# Patient Record
Sex: MALE | Race: WHITE | NOT HISPANIC OR LATINO | URBAN - METROPOLITAN AREA
[De-identification: names, ages, dates, MRNs, and addresses within clinical notes are randomized per-mention and may not be internally consistent; named-entity substitution may affect disease eponyms.]

---

## 2022-07-12 ENCOUNTER — EMERGENCY (EMERGENCY)
Facility: HOSPITAL | Age: 72
LOS: 1 days | Discharge: HOME | End: 2022-07-12
Attending: EMERGENCY MEDICINE | Admitting: HOSPITALIST

## 2022-07-12 VITALS
RESPIRATION RATE: 18 BRPM | DIASTOLIC BLOOD PRESSURE: 57 MMHG | OXYGEN SATURATION: 98 % | SYSTOLIC BLOOD PRESSURE: 138 MMHG | TEMPERATURE: 98 F | HEART RATE: 68 BPM

## 2022-07-12 LAB
ALBUMIN SERPL ELPH-MCNC: 4.1 G/DL — SIGNIFICANT CHANGE UP (ref 3.3–5)
ALP SERPL-CCNC: 60 U/L — SIGNIFICANT CHANGE UP (ref 40–120)
ALT FLD-CCNC: 7 U/L — SIGNIFICANT CHANGE UP (ref 4–41)
AMPHET UR-MCNC: NEGATIVE — SIGNIFICANT CHANGE UP
ANION GAP SERPL CALC-SCNC: 10 MMOL/L — SIGNIFICANT CHANGE UP (ref 7–14)
ANION GAP SERPL CALC-SCNC: 9 MMOL/L — SIGNIFICANT CHANGE UP (ref 7–14)
APAP SERPL-MCNC: <10 UG/ML — LOW (ref 15–25)
APPEARANCE UR: CLEAR — SIGNIFICANT CHANGE UP
AST SERPL-CCNC: 13 U/L — SIGNIFICANT CHANGE UP (ref 4–40)
BARBITURATES UR SCN-MCNC: NEGATIVE — SIGNIFICANT CHANGE UP
BASOPHILS # BLD AUTO: 0.04 K/UL — SIGNIFICANT CHANGE UP (ref 0–0.2)
BASOPHILS NFR BLD AUTO: 0.4 % — SIGNIFICANT CHANGE UP (ref 0–2)
BENZODIAZ UR-MCNC: NEGATIVE — SIGNIFICANT CHANGE UP
BILIRUB SERPL-MCNC: 0.4 MG/DL — SIGNIFICANT CHANGE UP (ref 0.2–1.2)
BILIRUB UR-MCNC: NEGATIVE — SIGNIFICANT CHANGE UP
BUN SERPL-MCNC: 12 MG/DL — SIGNIFICANT CHANGE UP (ref 7–23)
BUN SERPL-MCNC: 13 MG/DL — SIGNIFICANT CHANGE UP (ref 7–23)
CALCIUM SERPL-MCNC: 8.8 MG/DL — SIGNIFICANT CHANGE UP (ref 8.4–10.5)
CALCIUM SERPL-MCNC: 9.3 MG/DL — SIGNIFICANT CHANGE UP (ref 8.4–10.5)
CHLORIDE SERPL-SCNC: 95 MMOL/L — LOW (ref 98–107)
CHLORIDE SERPL-SCNC: 96 MMOL/L — LOW (ref 98–107)
CO2 SERPL-SCNC: 23 MMOL/L — SIGNIFICANT CHANGE UP (ref 22–31)
CO2 SERPL-SCNC: 24 MMOL/L — SIGNIFICANT CHANGE UP (ref 22–31)
COCAINE METAB.OTHER UR-MCNC: NEGATIVE — SIGNIFICANT CHANGE UP
COLOR SPEC: YELLOW — SIGNIFICANT CHANGE UP
COVID-19 SPIKE DOMAIN AB INTERP: POSITIVE
COVID-19 SPIKE DOMAIN ANTIBODY RESULT: >250 U/ML — HIGH
CREAT SERPL-MCNC: 0.68 MG/DL — SIGNIFICANT CHANGE UP (ref 0.5–1.3)
CREAT SERPL-MCNC: 0.82 MG/DL — SIGNIFICANT CHANGE UP (ref 0.5–1.3)
CREATININE URINE RESULT, DAU: 123 MG/DL — SIGNIFICANT CHANGE UP
DIFF PNL FLD: NEGATIVE — SIGNIFICANT CHANGE UP
EGFR: 93 ML/MIN/1.73M2 — SIGNIFICANT CHANGE UP
EGFR: 99 ML/MIN/1.73M2 — SIGNIFICANT CHANGE UP
EOSINOPHIL # BLD AUTO: 0.06 K/UL — SIGNIFICANT CHANGE UP (ref 0–0.5)
EOSINOPHIL NFR BLD AUTO: 0.6 % — SIGNIFICANT CHANGE UP (ref 0–6)
ETHANOL SERPL-MCNC: <10 MG/DL — SIGNIFICANT CHANGE UP
GLUCOSE SERPL-MCNC: 66 MG/DL — LOW (ref 70–99)
GLUCOSE SERPL-MCNC: 83 MG/DL — SIGNIFICANT CHANGE UP (ref 70–99)
GLUCOSE UR QL: ABNORMAL
HCT VFR BLD CALC: 34.4 % — LOW (ref 39–50)
HGB BLD-MCNC: 11.8 G/DL — LOW (ref 13–17)
IANC: 7.38 K/UL — SIGNIFICANT CHANGE UP (ref 1.8–7.4)
IMM GRANULOCYTES NFR BLD AUTO: 0.5 % — SIGNIFICANT CHANGE UP (ref 0–1.5)
KETONES UR-MCNC: NEGATIVE — SIGNIFICANT CHANGE UP
LEUKOCYTE ESTERASE UR-ACNC: NEGATIVE — SIGNIFICANT CHANGE UP
LYMPHOCYTES # BLD AUTO: 1.32 K/UL — SIGNIFICANT CHANGE UP (ref 1–3.3)
LYMPHOCYTES # BLD AUTO: 13.8 % — SIGNIFICANT CHANGE UP (ref 13–44)
MCHC RBC-ENTMCNC: 30.3 PG — SIGNIFICANT CHANGE UP (ref 27–34)
MCHC RBC-ENTMCNC: 34.3 GM/DL — SIGNIFICANT CHANGE UP (ref 32–36)
MCV RBC AUTO: 88.2 FL — SIGNIFICANT CHANGE UP (ref 80–100)
METHADONE UR-MCNC: NEGATIVE — SIGNIFICANT CHANGE UP
MONOCYTES # BLD AUTO: 0.71 K/UL — SIGNIFICANT CHANGE UP (ref 0–0.9)
MONOCYTES NFR BLD AUTO: 7.4 % — SIGNIFICANT CHANGE UP (ref 2–14)
NEUTROPHILS # BLD AUTO: 7.38 K/UL — SIGNIFICANT CHANGE UP (ref 1.8–7.4)
NEUTROPHILS NFR BLD AUTO: 77.3 % — HIGH (ref 43–77)
NITRITE UR-MCNC: NEGATIVE — SIGNIFICANT CHANGE UP
NRBC # BLD: 0 /100 WBCS — SIGNIFICANT CHANGE UP
NRBC # FLD: 0 K/UL — SIGNIFICANT CHANGE UP
OPIATES UR-MCNC: NEGATIVE — SIGNIFICANT CHANGE UP
OXYCODONE UR-MCNC: NEGATIVE — SIGNIFICANT CHANGE UP
PCP SPEC-MCNC: SIGNIFICANT CHANGE UP
PCP UR-MCNC: NEGATIVE — SIGNIFICANT CHANGE UP
PH UR: 6 — SIGNIFICANT CHANGE UP (ref 5–8)
PLATELET # BLD AUTO: 257 K/UL — SIGNIFICANT CHANGE UP (ref 150–400)
POTASSIUM SERPL-MCNC: 3.7 MMOL/L — SIGNIFICANT CHANGE UP (ref 3.5–5.3)
POTASSIUM SERPL-MCNC: 4.8 MMOL/L — SIGNIFICANT CHANGE UP (ref 3.5–5.3)
POTASSIUM SERPL-SCNC: 3.7 MMOL/L — SIGNIFICANT CHANGE UP (ref 3.5–5.3)
POTASSIUM SERPL-SCNC: 4.8 MMOL/L — SIGNIFICANT CHANGE UP (ref 3.5–5.3)
PROT SERPL-MCNC: 6.8 G/DL — SIGNIFICANT CHANGE UP (ref 6–8.3)
PROT UR-MCNC: ABNORMAL
RBC # BLD: 3.9 M/UL — LOW (ref 4.2–5.8)
RBC # FLD: 13.6 % — SIGNIFICANT CHANGE UP (ref 10.3–14.5)
SALICYLATES SERPL-MCNC: <0.3 MG/DL — LOW (ref 15–30)
SARS-COV-2 IGG+IGM SERPL QL IA: >250 U/ML — HIGH
SARS-COV-2 IGG+IGM SERPL QL IA: POSITIVE
SARS-COV-2 RNA SPEC QL NAA+PROBE: SIGNIFICANT CHANGE UP
SODIUM SERPL-SCNC: 128 MMOL/L — LOW (ref 135–145)
SODIUM SERPL-SCNC: 129 MMOL/L — LOW (ref 135–145)
SP GR SPEC: 1.02 — SIGNIFICANT CHANGE UP (ref 1–1.05)
THC UR QL: POSITIVE
TOXICOLOGY SCREEN, DRUGS OF ABUSE, SERUM RESULT: SIGNIFICANT CHANGE UP
TSH SERPL-MCNC: 2.9 UIU/ML — SIGNIFICANT CHANGE UP (ref 0.27–4.2)
UROBILINOGEN FLD QL: SIGNIFICANT CHANGE UP
WBC # BLD: 9.56 K/UL — SIGNIFICANT CHANGE UP (ref 3.8–10.5)
WBC # FLD AUTO: 9.56 K/UL — SIGNIFICANT CHANGE UP (ref 3.8–10.5)

## 2022-07-12 PROCEDURE — 99285 EMERGENCY DEPT VISIT HI MDM: CPT

## 2022-07-12 RX ORDER — TIZANIDINE 4 MG/1
2 TABLET ORAL EVERY 6 HOURS
Refills: 0 | Status: DISCONTINUED | OUTPATIENT
Start: 2022-07-12 | End: 2022-07-13

## 2022-07-12 RX ORDER — LISINOPRIL 2.5 MG/1
10 TABLET ORAL DAILY
Refills: 0 | Status: DISCONTINUED | OUTPATIENT
Start: 2022-07-12 | End: 2022-07-13

## 2022-07-12 RX ORDER — TAMSULOSIN HYDROCHLORIDE 0.4 MG/1
0.4 CAPSULE ORAL AT BEDTIME
Refills: 0 | Status: DISCONTINUED | OUTPATIENT
Start: 2022-07-12 | End: 2022-07-13

## 2022-07-12 RX ORDER — LEVETIRACETAM 250 MG/1
750 TABLET, FILM COATED ORAL
Refills: 0 | Status: DISCONTINUED | OUTPATIENT
Start: 2022-07-12 | End: 2022-07-13

## 2022-07-12 RX ORDER — RISEDRONATE SODIUM 25.8; 4.2 MG/1; MG/1
35 TABLET, FILM COATED ORAL
Refills: 0 | Status: DISCONTINUED | OUTPATIENT
Start: 2022-07-12 | End: 2022-07-14

## 2022-07-12 RX ORDER — POLYETHYLENE GLYCOL 3350 17 G/17G
17 POWDER, FOR SOLUTION ORAL DAILY
Refills: 0 | Status: DISCONTINUED | OUTPATIENT
Start: 2022-07-12 | End: 2022-07-13

## 2022-07-12 RX ORDER — PANTOPRAZOLE SODIUM 20 MG/1
40 TABLET, DELAYED RELEASE ORAL
Refills: 0 | Status: DISCONTINUED | OUTPATIENT
Start: 2022-07-12 | End: 2022-07-13

## 2022-07-12 RX ORDER — BACLOFEN 100 %
10 POWDER (GRAM) MISCELLANEOUS EVERY 8 HOURS
Refills: 0 | Status: DISCONTINUED | OUTPATIENT
Start: 2022-07-12 | End: 2022-07-13

## 2022-07-12 RX ORDER — RISEDRONATE SODIUM 25.8; 4.2 MG/1; MG/1
35 TABLET, FILM COATED ORAL
Refills: 0 | Status: DISCONTINUED | OUTPATIENT
Start: 2022-07-12 | End: 2022-07-12

## 2022-07-12 RX ORDER — GABAPENTIN 400 MG/1
400 CAPSULE ORAL THREE TIMES A DAY
Refills: 0 | Status: DISCONTINUED | OUTPATIENT
Start: 2022-07-12 | End: 2022-07-13

## 2022-07-12 RX ADMIN — GABAPENTIN 400 MILLIGRAM(S): 400 CAPSULE ORAL at 18:31

## 2022-07-12 RX ADMIN — TIZANIDINE 2 MILLIGRAM(S): 4 TABLET ORAL at 23:27

## 2022-07-12 RX ADMIN — TAMSULOSIN HYDROCHLORIDE 0.4 MILLIGRAM(S): 0.4 CAPSULE ORAL at 18:30

## 2022-07-12 RX ADMIN — Medication 10 MILLIGRAM(S): at 23:27

## 2022-07-12 RX ADMIN — LEVETIRACETAM 750 MILLIGRAM(S): 250 TABLET, FILM COATED ORAL at 18:31

## 2022-07-12 NOTE — ED PROVIDER NOTE - CPE EDP GASTRO NORM
----- Message from ELVIRA Romero sent at 4/8/2019  7:47 AM CDT -----  Please notify the patient of normal lab/xray results.  Follow-up as planned.  
Spoke with patient and given below results.  Patient states she is feeling fine right now.  
normal...

## 2022-07-12 NOTE — ED BEHAVIORAL HEALTH ASSESSMENT NOTE - AXIS III
MS, GERD, BPH, HTN, Arthritis Hx of meniscus tear/ACL tear bilateral knee & Hx of falls (uses walker) MS, GERD, BPH, HTN, Arthritis Hx of meniscus tear/ACL tear bilateral knee & Hx of falls (uses walker),sz disorder

## 2022-07-12 NOTE — ED BEHAVIORAL HEALTH ASSESSMENT NOTE - MEDICAL ISSUES AND PLAN (INCLUDE STANDING AND PRN MEDICATION)
Acetyl L-Carnitine & Alpha Lipoic Acid- 650mg, Levetiracetam 750mg BID, Metamucil 2 capsules BID, Lisinopril 10mg daily, Cheleated Mg 250mg daily, Nexium 20mg daily, Gabapentin 400mg TID, Baclofen 10mg PRN, Tamsulosin HCL 0.4mg QHS, Probiotic Capsule- 13 billion CFUs QHS, Risedronate 35mg - 1/week Saturday evening, Tizanidine 2mg HCL PRN

## 2022-07-12 NOTE — ED BEHAVIORAL HEALTH ASSESSMENT NOTE - NSSUICPROTFACT_PSY_ALL_CORE
Responsibility to children, family, or others Responsibility to children, family, or others/Positive therapeutic relationships

## 2022-07-12 NOTE — ED BEHAVIORAL HEALTH ASSESSMENT NOTE - PSYCHIATRIC ISSUES AND PLAN (INCLUDE STANDING AND PRN MEDICATION)
Consider ECT; Zyprexa 2.5mg PO/IM PRN agitation Consider ECT; Zyprexa 2.5mg PO/IM PRN agitation if QTC less than 500

## 2022-07-12 NOTE — ED PROVIDER NOTE - PATIENT PORTAL LINK FT
You can access the FollowMyHealth Patient Portal offered by [facility] by registering at the following website: [email or web address]. By joining [facility]’s FollowMyHealth portal, you will also be able to view your health information using other applications (apps) compatible with our system.

## 2022-07-12 NOTE — ED BEHAVIORAL HEALTH NOTE - BEHAVIORAL HEALTH NOTE
Worker called patient’s wife Antoinette Ruth ([phone]) for collateral information. All information is as per Mrs. Ruth:    Patient is a 72-year-old male, domiciled with wife, with a dx of depression, with a last psychiatric admission at [facility] years ago for suicidal ideation for more than a week, bib accompanied by spouse for worsening depression. Mrs. Ruth states that the patient walks with a walker and has trouble walking. She states that she wanted to take the patient to [facility], but they are on diversion. She states that the patient’s psychiatrist Zoey Méndez ([phone]) recommended the patient to go to the ed because the patient is very depressed. She states that she sent documentation of the patient’s medication list (provided to team). She states that the patient is not taking any anti-depressants because of his hypernatremia. She states that the patient is complaint with his medications. She states that the patient’s sleeping has been on and off. Patient has had MS for more than twenty years. Patient has been having diarrhea since yesterday. This is one of the patient’s triggers because pt tends to soil himself. She states that the patient saw his  yesterday and then came home and had a bowel accident. She states that the patient has been saying that he does not want to be alive anymore. Wife states that at times he would say this when he is tired. Since the pandemic the patient has been more isolated. Patient had hearing loss 6 months and had an MRI which came back normal. Wife states that the patient has a HHA two days a week who helps him with cooking, bathing, and keeps him company. Wife states she has been pushing the patient to engage in PT but he keeps saying, “I will do it tomorrow”. She states that the patient has had several knee surgeries and had a surgery with his ankle. She states that the patient has no AH/VH and is not violent or aggressive. She states that the patient is a recovering alcoholic and has not used alcohol since last October. She states that the patient had a plan to put a plastic bag over his head when he was admitted to [facility] a couple years ago. Pt has no access to guns or weapons. Patient was vaccinated for covid-19 with two doses and boosted (moderna 3/1/2021; 3/29/2021; 11/12/2021). Patient has been saying he does not want to be alive, and he does not want to be a burden on anyone. Patient has no family hx of mental illness. Patient’s father who is 95 has dementia. Case discussed with psychiatry. Worker called patient’s wife Antoinette Ruth ([phone]) for collateral information. All information is as per Mrs. Ruth:    Patient is a 72-year-old male, domiciled with wife, with a dx of depression, with a last psychiatric admission at [facility] years ago for suicidal ideation for more than a week, bib accompanied by spouse for worsening depression. Mrs. Ruth states that the patient walks with a walker and has trouble walking. She states that she wanted to take the patient to [facility], but they are on diversion. She states that the patient’s psychiatrist Zoey Méndez ([phone]) recommended the patient to go to the ed because the patient is very depressed. She states that she sent documentation of the patient’s medication list (provided to team). She states that the patient is not taking any anti-depressants because of his hypernatremia. She states that the patient is complaint with his medications. She states that the patient’s sleeping has been on and off. Patient has had MS for more than twenty years. Patient has been having diarrhea since yesterday. This is one of the patient’s triggers because pt tends to soil himself. She states that the patient saw his  yesterday and then came home and had a bowel accident. She states that the patient has been saying that he does not want to be alive anymore. Wife states that at times he would say this when he is tired. Since the pandemic the patient has been more isolated. Patient had hearing loss 6 months and had an MRI which came back normal. Wife states that the patient has a HHA two days a week who helps him with cooking, bathing, and keeps him company. Wife states she has been pushing the patient to engage in PT but he keeps saying, “I will do it tomorrow”. She states that the patient has had several knee surgeries and had a surgery with his ankle. She states that the patient has no AH/VH and is not violent or aggressive. She states that the patient is a recovering alcoholic and has not used alcohol since last October. She states that the patient had a plan to put a plastic bag over his head when he was admitted to [facility] a couple years ago. Pt has no access to guns or weapons. Patient was vaccinated for covid-19 with two doses and boosted (moderna 3/1/2021; 3/29/2021; 11/12/2021). Patient has been saying he does not want to be alive, and he does not want to be a burden on anyone. Patient has no family hx of mental illness. Patient’s father who is 95 has dementia. Case discussed with psychiatry.  pt is boarding in the ED for patient admission. No geriatric beds [facility]. Worker called patient’s wife Antoinette Ruth ([phone]) for collateral information. All information is as per Mrs. Ruth:    Patient is a 72-year-old male, domiciled with wife, with a dx of depression, with a last psychiatric admission at [facility] years ago for suicidal ideation for more than a week, bib accompanied by spouse for worsening depression. Mrs. Ruth states that the patient walks with a walker and has trouble walking. She states that she wanted to take the patient to [facility], but they are on diversion. She states that the patient’s psychiatrist Zoey Méndez ([phone]) recommended the patient to go to the ed because the patient is very depressed. She states that she sent documentation of the patient’s medication list (provided to team). She states that the patient is not taking any anti-depressants because of his hypernatremia. She states that the patient is complaint with his medications. She states that the patient’s sleeping has been on and off. Patient has had MS for more than twenty years. Patient has been having diarrhea since yesterday. This is one of the patient’s triggers because pt tends to soil himself. She states that the patient saw his  yesterday and then came home and had a bowel accident. She states that the patient has been saying that he does not want to be alive anymore. Wife states that at times he would say this when he is tired. Since the pandemic the patient has been more isolated. Patient had hearing loss 6 months and had an MRI which came back normal. Wife states that the patient has a HHA two days a week who helps him with cooking, bathing, and keeps him company. Wife states she has been pushing the patient to engage in PT but he keeps saying, “I will do it tomorrow”. She states that the patient has had several knee surgeries and had a surgery with his ankle. She states that the patient has no AH/VH and is not violent or aggressive. She states that the patient is a recovering alcoholic and has not used alcohol since last October. She states that the patient had a plan to put a plastic bag over his head when he was admitted to [facility] a couple years ago. Pt has no access to guns or weapons. Patient was vaccinated for covid-19 with two doses and boosted (moderna 3/1/2021; 3/29/2021; 11/12/2021). Patient has been saying he does not want to be alive, and he does not want to be a burden on anyone. Patient has no family hx of mental illness. Patient’s father who is 95 has dementia. Case discussed with psychiatry.  pt is boarding in the ED for patient admission. No geriatric beds [facility].  Patient's wife states that she will bring the patient's medication to the ed.

## 2022-07-12 NOTE — ED BEHAVIORAL HEALTH ASSESSMENT NOTE - NSBHATTESTCOMMENTATTENDFT_PSY_A_CORE
Patient is a 72 year old  retired male currently residing with wife and HHA 2x per week. PPH MDD. Hx of 1 past inpatient admission at [facility] for suicidal ideation. No past suicide attempts. No history of aggression/violence. + history of substance use- alcohol use disorder in  since 2005- past history of rehab, AA & history of seizure (not withdrawal r/t). No other substance use. Past history of DWI in 1996. PMH- MS, GERD, BPH, HTN, Arthritis Hx of meniscus tear/ACL tear bilateral knee & Hx of falls (uses walker). BIB wife for suicidal ideation.    Patient presents to the ED for suicidal ideation. Patient endorses worsening suicidal ideation x a few months- wanting to die/kill self related to poor quality of life due to MS and impaired ability to function. He is hopeless, unable to safety plan. Patient presents an imminent risk to self requires inpatient admission for safety and stabilization.

## 2022-07-12 NOTE — ED BEHAVIORAL HEALTH ASSESSMENT NOTE - DESCRIPTION
Followed protocol During course of ED visit patient was calm and cooperative. Patient was not aggressive or violent and did not require PRN medications.    ICU Vital Signs Last 24 Hrs  T(C): 36.7 (12 Jul 2022 11:44), Max: 36.7 (12 Jul 2022 11:44)  T(F): 98.1 (12 Jul 2022 11:44), Max: 98.1 (12 Jul 2022 11:44)  HR: 68 (12 Jul 2022 11:44) (68 - 68)  BP: 138/57 (12 Jul 2022 11:44) (138/57 - 138/57)  BP(mean): --  ABP: --  ABP(mean): --  RR: 18 (12 Jul 2022 11:44) (18 - 18)  SpO2: 98% (12 Jul 2022 11:44) (98% - 98%) MS, GERD, BPH, HTN, Arthritis Hx of meniscus tear/ACL tear bilateral knee & Hx of falls (uses walker) 72 year old male, retired,  x 49 years, lives with wife. Has HHA 2x week MS, GERD, BPH, HTN, Arthritis Hx of meniscus tear/ACL tear bilateral knee & Hx of falls (uses walker), sz disorder

## 2022-07-12 NOTE — ED BEHAVIORAL HEALTH NOTE - BEHAVIORAL HEALTH NOTE
Spoke with charge nurse- informed of patient care needs-  needs assistance walking/bathing and with ADLs. Recommended 1:1 with hospital  bed while boarding. Discussed care needs. Falls risk. Discussed diet and assistance with food/needs chopped.

## 2022-07-12 NOTE — ED PROVIDER NOTE - NSFOLLOWUPCLINICS_GEN_ALL_ED_FT
[facility]  Behavioral Health  [id number] [place] [address]  Phone: [phone]  Fax:

## 2022-07-12 NOTE — ED BEHAVIORAL HEALTH ASSESSMENT NOTE - PAST PSYCHOTROPIC MEDICATION
Celexa 20mg- took several years- affected sodium levels stopped 2017; Cymbalta 20mg - stopped 11/17 due to hyponatremia, Nortriptyline 25mg - caused AMS at 50mg, Remeron 7.5mg QHS- caused agitation, Wellbutrin,

## 2022-07-12 NOTE — ED ADULT TRIAGE NOTE - CHIEF COMPLAINT QUOTE
Pt brought in by EMS from outpatient facility complaining of SI with no plan and depression.  pt denies auditory/ visual hallucinations. Pt walks with walker.

## 2022-07-12 NOTE — ED BEHAVIORAL HEALTH ASSESSMENT NOTE - HPI (INCLUDE ILLNESS QUALITY, SEVERITY, DURATION, TIMING, CONTEXT, MODIFYING FACTORS, ASSOCIATED SIGNS AND SYMPTOMS)
Patient is a 72 year old  retired male currently residing with wife and HHA 2x per week. PPH MDD. Hx of 1 past inpatient admission at [facility] for suicidal ideation. No past suicide attempts. No history of aggression/violence. + history of substance use- alcohol use disorder in  since 2005- past history of rehab, AA & history of seizure (not withdrawal r/t). No other substance use. Past history of DWI in 1996. PMH- MS, GERD, BPH, HTN, Arthritis Hx of meniscus tear/ACL tear bilateral knee & Hx of falls (uses walker). BIB wife for suicidal ideation.    Patient reports he came to the ED because he wants to die. He stated he has had MS since 2005 and now has a poor quality of life. He stated he is a retired  and cannot taste, smell or walk. He reports "I'm 72, I've had enough." He stated the only reason he wants to keep living is because of his wife who he has been  to for 49 years. He reports he feels like a burden- discussing how his wife has to care for him. He stated yesterday he had explosive diarrhea and she had to come home from work to help him clean up. He stated he needs help showering, cannot do anything and can't use his right hand. He stated he has trouble swallowing- has to have food cut. Patient stated he wants to kill self but has no specific plan. He is fearful he will fail and "I'll end up worse than I am now."     Patient denies any hallucinations, does not report any delusional thought content, denies thought insertion/withdrawal, denies referential thought processes & is not paranoid on interview. Pt is linear,logical, organized and well related. Patient does not report nor exhibit any signs of jay, including irritable or elevated mood, grandiosity, pressured speech, risk-taking behaviors, increase in productivity or agitation. Patient denies any HI, violent thoughts. Patient is a 72 year old  retired male currently residing with wife and HHA 2x per week. PPH MDD. Hx of 1 past inpatient admission at [facility] for suicidal ideation. No past suicide attempts. No history of aggression/violence. + history of substance use- alcohol use disorder in  since 2005- past history of rehab, AA & history of seizure (not withdrawal r/t). No other substance use. Past history of DWI in 1996. PMH- MS, GERD, BPH, HTN, Arthritis Hx of meniscus tear/ACL tear bilateral knee & Hx of falls (uses walker). BIB wife for suicidal ideation.    Patient reports he came to the ED because he wants to die. He stated he has had MS since 2005 and now has a poor quality of life. He stated he is a retired  and cannot taste, smell or walk. He reports "I'm 72, I've had enough." He stated the only reason he wants to keep living is because of his wife who he has been  to for 49 years. He reports he feels like a burden- discussing how his wife has to care for him. He stated yesterday he had explosive diarrhea and she had to come home from work to help him clean up. He stated he needs help showering, cannot do anything and can't use his right hand. He stated he has trouble swallowing- has to have food cut. Patient stated he wants to kill self but has no specific plan. He is fearful he will fail and "I'll end up worse than I am now." He stated he can't sleep due to muscle spasms.     Patient denies any hallucinations, does not report any delusional thought content, denies thought insertion/withdrawal, denies referential thought processes & is not paranoid on interview. Pt is linear,logical, organized and well related. Patient does not report nor exhibit any signs of jay, including irritable or elevated mood, grandiosity, pressured speech, risk-taking behaviors, increase in productivity or agitation. Patient denies any HI, violent thoughts. Patient is a 72 year old  retired male currently residing with wife and HHA 2x per week. PPH MDD. Hx of 1 past inpatient admission at [facility] for suicidal ideation. No past suicide attempts. No history of aggression/violence. + history of substance use- alcohol use disorder in  since 2005- past history of rehab, AA & history of seizure (not withdrawal r/t). No other substance use. Past history of DWI in 1996. PMH- MS, GERD, BPH, HTN, , Sz disorder, Arthritis Hx of meniscus tear/ACL tear bilateral knee & Hx of falls (uses walker). BIB wife for suicidal ideation.    Patient reports he came to the ED because he wants to die. He stated he has had MS since 2005 and now has a poor quality of life. He stated he is a retired  and cannot taste, smell or walk. He reports "I'm 72, I've had enough." He stated the only reason he wants to keep living is because of his wife who he has been  to for 49 years. He reports he feels like a burden- discussing how his wife has to care for him. He stated yesterday he had explosive diarrhea and she had to come home from work to help him clean up. He stated he needs help showering, cannot do anything and can't use his right hand. He stated he has trouble swallowing- has to have food cut. Patient stated he wants to kill self but has no specific plan. He is fearful he will fail and "I'll end up worse than I am now." He stated he can't sleep due to muscle spasms.     Patient denies any hallucinations, does not report any delusional thought content, denies thought insertion/withdrawal, denies referential thought processes & is not paranoid on interview. Pt is linear,logical, organized and well related. Patient does not report nor exhibit any signs of jay, including irritable or elevated mood, grandiosity, pressured speech, risk-taking behaviors, increase in productivity or agitation. Patient denies any HI, violent thoughts.    See  note for collateral information.

## 2022-07-12 NOTE — ED BEHAVIORAL HEALTH ASSESSMENT NOTE - RISK ASSESSMENT
modifiable risk factors- hopelessness, suicidal ideation    unmodifiable risk factors- chronic medical illness, history of substance use, history of inpatient admission    protective factors- no jay, no psychosis, no violence/aggression, no HI, supportive family, engaged in tx Moderate Acute Suicide Risk

## 2022-07-12 NOTE — ED ADULT NURSE NOTE - OBJECTIVE STATEMENT
Pt arrived to  reporting depression and passive wishes to be dead with no plan. Pt calm and cooperative, denies H/I A/H V/H. Pt AMBULATES WITH WALKER ONLY. Manager Payal made aware. RN was advised to place patient in low acuity  with walker. Pt wanded for safety, changed into  clothing, personal property collected and logged.

## 2022-07-12 NOTE — ED BEHAVIORAL HEALTH NOTE - BEHAVIORAL HEALTH NOTE
COVID Exposure Screen- Patient    1.	*Have you had a COVID-19 test in the last 90 days?  (  ) Yes   (x  ) No   (  ) Unknown- Reason: _____  IF YES PROCEED TO QUESTION #2. IF NO OR UNKNOWN, PLEASE SKIP TO QUESTION #3.  2.	Date of test(s) and result(s): ________    3.	*Have you tested positive for COVID-19 antibodies? (  ) Yes   (x  ) No   (  ) Unknown- Reason: _____  IF YES PROCEED TO QUESTION #4. IF NO or UNKNOWN, PLEASE SKIP TO QUESTION #5.  4.	Date of positive antibody test: ________    5.	*Have you received 2 doses of the COVID-19 vaccine? (x  ) Yes   (  ) No   (  ) Unknown- Reason: _____   IF YES PROCEED TO QUESTION #6. IF NO or UNKNOWN, PLEASE SKIP TO QUESTION #7.  6.	Date of second dose: 2021  7.	*In the past 10 days, have you been around anyone with a positive COVID-19 test?* (  ) Yes   ( x ) No   (  ) Unknown- Reason: ____  IF YES PROCEED TO QUESTION #8. IF NO or UNKNOWN, PLEASE SKIP TO QUESTION #13.  8.	Were you within 6 feet of them for at least 15 minutes? (  ) Yes   (  ) No   (  ) Unknown- Reason: _____  9.	Have you provided care for them? (  ) Yes   (  ) No   (  ) Unknown- Reason: ______  10.	Have you had direct physical contact with them (touched, hugged, or kissed them)? (  ) Yes   (  ) No    (  ) Unknown- Reason: _____  11.	Have you shared eating or drinking utensils with them? (  ) Yes   (  ) No    (  ) Unknown- Reason: ____  12.	Have they sneezed, coughed, or somehow gotten respiratory droplets on you? (  ) Yes   (  ) No    (  ) Unknown- Reason: ______    13.	*Have you been out of [place] within the past 10 days?* (  ) Yes   (x  ) No   (  ) Unknown- Reason: _____  IF YES PLEASE ANSWER THE FOLLOWING QUESTIONS:  14.	Which state/country have you been to? ______  15.	Were you there over 24 hours? (  ) Yes   (  ) No    (  ) Unknown- Reason: ______  16.	Date of return to [facility]: ______

## 2022-07-12 NOTE — ED ADULT NURSE NOTE - NSIMPLEMENTINTERV_GEN_ALL_ED
Implemented All Fall Risk Interventions:  [place] to call system. Call bell, personal items and telephone within reach. Instruct patient to call for assistance. Room bathroom lighting operational. Non-slip footwear when patient is off stretcher. Physically safe environment: no spills, clutter or unnecessary equipment. Stretcher in lowest position, wheels locked, appropriate side rails in place. Provide visual cue, wrist band, yellow gown, etc. Monitor gait and stability. Monitor for mental status changes and reorient to person, place, and time. Review medications for side effects contributing to fall risk. Reinforce activity limits and safety measures with patient and family.

## 2022-07-12 NOTE — ED PROVIDER NOTE - PROGRESS NOTE DETAILS
Derrell SALGADO: Patient was signed out to me pending bed placement. Patient was to be admitted to psych for MDD. Per psych attending, Dr. Simmons, there are no Geriatric beds. Patient to be admitted to medicine. Discussed with hospitalist, Dr. Carias who accepted admission. Derrell SALGADO: Per Dr. Simmons, after discussion with patient's wife and safety plan, patient can be discharged home. Patient's family to come pick him up. Hospitalist informed by Dr. Simmons who requested that admission be reversed as he has not evaluated patient yet. Discussed with charge RN and ANM - will reverse admission and discharge. They will inform bed board.

## 2022-07-12 NOTE — ED BEHAVIORAL HEALTH ASSESSMENT NOTE - CURRENT MEDICATION
Acetyl L-Carnitine & Alpha Lipoic Acid- 650mg, Levetiracetam 750mg BID, Metamucil 2 capsules BID, Lisinopril 10mg daily, Cheleated Mg 250mg daily, Nexium 20mg daily, Gabapentin 400mg TID, Baclofen 10mg PRN, Tamsulosin HCL 0.4mg QHS, Probiotic Capsule- 13 billion CFUs QHS, Risedronate 35mg - 1/week Saturday evening, Tizanidine 2mg HCL PRN, Curaleaf PRN

## 2022-07-12 NOTE — ED BEHAVIORAL HEALTH ASSESSMENT NOTE - OTHER PAST PSYCHIATRIC HISTORY (INCLUDE DETAILS REGARDING ONSET, COURSE OF ILLNESS, INPATIENT/OUTPATIENT TREATMENT)
PPH MDD. Hx of 1 past inpatient admission at [facility] for suicidal ideation. No past suicide attempts.

## 2022-07-12 NOTE — ED PROVIDER NOTE - OBJECTIVE STATEMENT
72 yr old male with hx of MS, HTN, depression (off meds due to hyponatremia) presents with worsening depression/SI.  States he does not have a plan, "if I had a plan I would have done it already".  Went to outpatient psychiatrist (wife has name and number) who sent in for evaluation possible admission.  Normally follows at [facility] and last psychiatric admission was a few years back.  patient denies any medical complaints.

## 2022-07-12 NOTE — ED PROVIDER NOTE - NSFOLLOWUPINSTRUCTIONS_ED_ALL_ED_FT
You were seen by psychiatry today for depression. Please follow all recommendations including the safety plan. If you feel suicidal, return to the emergency department.

## 2022-07-13 VITALS
DIASTOLIC BLOOD PRESSURE: 74 MMHG | OXYGEN SATURATION: 99 % | RESPIRATION RATE: 18 BRPM | SYSTOLIC BLOOD PRESSURE: 134 MMHG | HEART RATE: 87 BPM | TEMPERATURE: 99 F

## 2022-07-13 DIAGNOSIS — F33.9 MAJOR DEPRESSIVE DISORDER, RECURRENT, UNSPECIFIED: ICD-10-CM

## 2022-07-13 RX ADMIN — LEVETIRACETAM 750 MILLIGRAM(S): 250 TABLET, FILM COATED ORAL at 07:15

## 2022-07-13 RX ADMIN — PANTOPRAZOLE SODIUM 40 MILLIGRAM(S): 20 TABLET, DELAYED RELEASE ORAL at 07:16

## 2022-07-13 RX ADMIN — GABAPENTIN 400 MILLIGRAM(S): 400 CAPSULE ORAL at 13:00

## 2022-07-13 RX ADMIN — GABAPENTIN 400 MILLIGRAM(S): 400 CAPSULE ORAL at 07:16

## 2022-07-13 NOTE — ED ADULT NURSE REASSESSMENT NOTE - NS ED NURSE REASSESS COMMENT FT1
Pt transferred to ESS[facility] 3. Pt cooperative at this time, made aware of treatment plan. Pt transferred with personal belongings and walker. Pt to be observed on 1:1 close observation.

## 2022-07-13 NOTE — ED BEHAVIORAL HEALTH NOTE - BEHAVIORAL HEALTH NOTE
Worker called [facility] and spoke to Sneha who states there is no geriatric unit or bed at this time.

## 2022-07-13 NOTE — ED BEHAVIORAL HEALTH NOTE - BEHAVIORAL HEALTH NOTE
Writer emailed [facility] for bed availability.  Writer received email no geriatric beds will be available today.  Writer called Sherley  at [facility] left a voicemail requesting a callback.  Writer called [facility], no beds available today.  Psych Ed provider sent Teams message to [facility] for availability.

## 2022-07-13 NOTE — BH CONSULTATION LIAISON PROGRESS NOTE - NSBHATTESTCOMMENTATTENDFT_PSY_A_CORE
Patient is a 72 year old  retired male currently residing with wife and HHA 2x per week. PPH MDD. Hx of 1 past inpatient admission at [facility] for suicidal ideation. No past suicide attempts. No history of aggression/violence. + history of substance use- alcohol use disorder in  since 2005- past history of rehab, AA & history of seizure (not withdrawal r/t). No other substance use. Past history of DWI in 1996. PMH- MS, GERD, BPH, HTN, Arthritis Hx of meniscus tear/ACL tear bilateral knee & Hx of falls (uses walker). BIB wife for worsening suicidal ideation x a few months- wanting to die/kill self related to poor quality of life due to MS and impaired ability to function. He is hopeless, unable to safety plan. Patient presents an imminent risk to self requires inpatient admission for safety and stabilization.    On reassessment, patient denies SI and pleads to go home. He initially minimizes SI, but later acknowledges the specific plans he endorsed to his psychiatrist and characterizes these statements as an acute and contextual exacerbation of chronic dysthymia. He continues to adamantly deny SI. Although he is at chronically elevated risk of harm to self given chronic medical condition, white male >65, and chronic dysthymia, acute risk is mitigated by absence of SI,  and many protective factors including his wife, hopeful about his future, no prior SAs, fear of the act itself, beloved pets, engagement in care, and ability to safety plan. He does not currently meet criteria for involuntary psychiatric hospitalization and may be discharged home with close follow up.

## 2022-07-13 NOTE — ED ADULT NURSE REASSESSMENT NOTE - NS ED NURSE REASSESS COMMENT FT1
pt is currently awake, a&ox3, calm and laying in bed with sad affect but cooperative. VSS. AM medications administered. Pending psychiatric admission. Will continue to monitor for safety.

## 2022-07-13 NOTE — BH CONSULTATION LIAISON PROGRESS NOTE - NSBHASSESSMENTFT_PSY_ALL_CORE
Patient is a 72 year old  retired male currently residing with wife and HHA 2x per week. PPH MDD. Hx of 1 past inpatient admission at [facility] for suicidal ideation. No past suicide attempts. No history of aggression/violence. + history of substance use- alcohol use disorder in  since 2005- past history of rehab, AA & history of seizure (not withdrawal r/t). No other substance use. Past history of DWI in 1996. PMH- MS, GERD, BPH, HTN, Arthritis Hx of meniscus tear/ACL tear bilateral knee & Hx of falls (uses walker). BIB wife for worsening suicidal ideation x a few months- wanting to die/kill self related to poor quality of life due to MS and impaired ability to function. He is hopeless, unable to safety plan. Patient presents an imminent risk to self requires inpatient admission for safety and stabilization.    On reassessment, patient denies SI and pleads to go home, minimizing recent SI. Despite patient no longer endorsing active suicidal ideation, he continues to represent elevated acute risk of harm to self given the severity of his recent SI, hopelessness, history of inpatient psychiatric hospitalization for SI, and poor insight/minimizing symptoms, and would benefit from inpatient admission for safety and stabilization. Patient is a 72 year old  retired male currently residing with wife and HHA 2x per week. PPH MDD. Hx of 1 past inpatient admission at [facility] for suicidal ideation. No past suicide attempts. No history of aggression/violence. + history of substance use- alcohol use disorder in  since 2005- past history of rehab, AA & history of seizure (not withdrawal r/t). No other substance use. Past history of DWI in 1996. PMH- MS, GERD, BPH, HTN, Arthritis Hx of meniscus tear/ACL tear bilateral knee & Hx of falls (uses walker). BIB wife for worsening suicidal ideation x a few months- wanting to die/kill self related to poor quality of life due to MS and impaired ability to function. He is hopeless, unable to safety plan. Patient presents an imminent risk to self requires inpatient admission for safety and stabilization.    On reassessment, patient denies SI and pleads to go home, minimizing recent SI. Despite patient no longer endorsing active suicidal ideation, he continues to represent elevated acute risk of harm to self given the severity of his recent SI, hopelessness, history of inpatient psychiatric hospitalization for SI, and poor insight/ actively minimizing symptoms, and would benefit from inpatient admission for safety and stabilization. Patient is a 72 year old  retired male currently residing with wife and HHA 2x per week. PPH MDD. Hx of 1 past inpatient admission at [facility] for suicidal ideation. No past suicide attempts. No history of aggression/violence. + history of substance use- alcohol use disorder in  since 2005- past history of rehab, AA & history of seizure (not withdrawal r/t). No other substance use. Past history of DWI in 1996. PMH- MS, GERD, BPH, HTN, Arthritis Hx of meniscus tear/ACL tear bilateral knee & Hx of falls (uses walker). BIB wife for worsening suicidal ideation x a few months- wanting to die/kill self related to poor quality of life due to MS and impaired ability to function. He is hopeless, unable to safety plan. Patient presents an imminent risk to self requires inpatient admission for safety and stabilization.    On reassessment, patient denies SI and pleads to go home, minimizing recent SI. Despite patient no longer endorsing active suicidal ideation, he continues to represent elevated acute risk of harm to self given the severity of his recent SI, hopelessness, history of inpatient psychiatric hospitalization for SI, and poor insight/ actively minimizing symptoms, and would benefit from inpatient admission for safety, stabilization, and treatment planning including considering non-pharmacologic interventions such as ECT or TMS.  Patient is a 72 year old  retired male currently residing with wife and HHA 2x per week. PPH MDD. Hx of 1 past inpatient admission at [facility] for suicidal ideation. No past suicide attempts. No history of aggression/violence. + history of substance use- alcohol use disorder in  since 2005- past history of rehab, AA & history of seizure (not withdrawal r/t). No other substance use. Past history of DWI in 1996. PMH- MS, GERD, BPH, HTN, Arthritis Hx of meniscus tear/ACL tear bilateral knee & Hx of falls (uses walker). BIB wife for worsening suicidal ideation x a few months- wanting to die/kill self related to poor quality of life due to MS and impaired ability to function. He is hopeless, unable to safety plan. Patient presents an imminent risk to self requires inpatient admission for safety and stabilization.    On reassessment, patient denies SI and pleads to go home. He initially minimizes SI, but later acknowledges the specific plans he endorsed to his psychiatrist and characterizes these statements as an acute and contextual exacerbation of chronic dysthymia. He continues to adamantly deny SI. Although he is at chronically elevated risk of harm to self given chronic medical condition, white male >65, and chronic dysthymia, acute risk is mitigated by absence of SI,  and many protective factors including his wife, hopeful about his future, no prior SAs, fear of the act itself, beloved pets, engagement in care, and ability to safety plan. He does not currently meet criteria for involuntary psychiatric hospitalization and may be discharged home with close follow up.

## 2022-07-13 NOTE — ED BEHAVIORAL HEALTH NOTE - BEHAVIORAL HEALTH NOTE
Writer was informed patient will be discharged.  Pt spoke to his wife.  Pt's wife will transport pt home at 6:30pm.  Writer offered to arrange ambulette, she states patient is able to ambulate in and out of vehicle and is able to drive on his own.  She spoke to patient to re assure him she will transport him home.

## 2022-07-13 NOTE — BH CONSULTATION LIAISON PROGRESS NOTE - NSBHATTESTAPPAMEND_PSY_A_CORE
I have personally seen and examined this patient. I fully participated in the care of this patient. I have made amendments to the documentation where appropriate and otherwise agree with the history, physical exam, and plan as documented by the FELIPE

## 2022-07-13 NOTE — BH SAFETY PLAN - LOCAL URGENT CARE NAME
[facility], [phone], [address] [place] [address]

## 2022-07-13 NOTE — ED BEHAVIORAL HEALTH NOTE - BEHAVIORAL HEALTH NOTE
Writer was asked by Dr. Dove to assist patient with a call to his wife.  Writer met with patient who wanted to speak to his wife.  Writer called pt's wife Antoinette, however she stated she was in a meeting and will call back later.  Writer informed patient of same.

## 2022-07-13 NOTE — BH CONSULTATION LIAISON PROGRESS NOTE - NSBHFUPINTERVALHXFT_PSY_A_CORE
Patient seen for follow up of suicidal ideation. He states that he no longer wishes to end his life, stating "wanting to die was an act of desperation, wanting a way out, but there is still value in life. As miserable as it is living with MS, I still want to live." He cites his wife of 49 years as a major protective factor, saying that he could not live without her and he fears she could not live without him. He describes hopes to return home and eat the food he still enjoys despite losing his sense of taste. Poor sleep last night, fair appetite this morning. Patient seen for follow up of suicidal ideation. He states that he no longer wishes to end his life, stating "wanting to die was an act of desperation, wanting a way out, but there is still value in life. As miserable as it is living with MS, I still want to live." He cites his wife of 49 years as a major protective factor, saying that he could not live without her and he fears she could not live without him. He describes hopes to return home and eat the food he still enjoys despite losing his sense of taste. States that he wishes to go home where he is comfortable and that staying in the ED is "like a jail." Poor sleep last night, fair appetite this morning.    Spoke to patient's wife today. Longstanding dysthymia in the setting of medical problems, exacerbated by contextual factors (diarrhea, poor sleep). Often his low mood will resolve in several hours. At baseline when he feels low he makes statements that life is not worth living, that he wishes to die. Mood has declined since covid w/ increased isolation, also distressed by political climate and loss of hearing in [place] 6 months ago. She describes chronic dysthymia worsened over the past 6 months.  Last admission at [facility], patient had a plan and was in an online group called ?"final exit" of individuals planning suicide.  At this time wife is "not a concerned as I was a few years ago when he had a real plan." Not sure if she thinks he needs hospitalization, would accept him home.    Pt's outpatient therapist Abdiaziz Cunningham: [phone]: reporting increase in psychiatric symptoms, low mood, amotivation, no meaning in life, using generalized statements that "I don't have a lot to live for" but qualifies to say that he would not hurt himself. Yesterday on seeing Dr. Méndez "patient says he would kill himself with a gun or slash his throat with a knife if he had the courage, and if he lived in a state with assisted suicide he would use that." Patient seen for follow up of suicidal ideation. He states that he no longer wishes to end his life, stating "wanting to die was an act of desperation, wanting a way out, but there is still value in life. As miserable as it is living with MS, I still want to live." He cites his wife of 49 years as a major protective factor, saying that he could not live without her and he fears she could not live without him. He describes hopes to return home and eat the food he still enjoys despite losing his sense of taste. States that he wishes to go home where he is comfortable and that staying in the ED is "like a assisted." Poor sleep last night, fair appetite this morning.    Spoke to patient's wife today. Longstanding dysthymia in the setting of medical problems, exacerbated by contextual factors (diarrhea, poor sleep). Often his low mood will resolve in several hours. At baseline when he feels low he makes statements that life is not worth living, that he wishes to die. Mood has declined since covid w/ increased isolation, also distressed by political climate and loss of hearing in [place] 6 months ago. She describes chronic dysthymia worsened over the past 6 months.  Last admission at [facility], patient had a plan and was in an online group called ?"final exit" of individuals planning suicide.  At this time wife is "not a concerned as I was a few years ago when he had a real plan." Not sure if she thinks he needs hospitalization, would accept him home.    Pt's outpatient therapist Abdiaziz Cunningham: [phone]: reporting increase in psychiatric symptoms, low mood, amotivation, no meaning in life, using generalized statements that "I don't have a lot to live for" but qualifies to say that he would not hurt himself. Yesterday on seeing Dr. Méndez "patient says he would kill himself with a gun or slash his throat with a knife if he had the courage, and if he lived in a state with assisted suicide he would use that."    Per collateral from outpatient Psychiatrist Dr. Méndez ([phone]):   Chronically dysthymic, worsened due to isolation during the pandemic, and recently even more depressed. Has repeatedly developed hyponatremia on each antidepressant tried, weaned off buproprion last year, and no further plans for antidepressant meds given consistent pattern of hyponatremia.  Saying he wants to die right now in the setting of bowel incontinence. States that if he had a gun he would shoot himself. Has a history of looking up how to commit suicide (at time of last hospitalization). Provider felt that patient was not safe to return home, concerned for imminent risk.  Prior meds: Wellbutrin (used for the longest of all), Celexa, Cymbalta, nortriptyline, remeron; all had to be discontinued due to hyponatremia. Patient seen for follow up of suicidal ideation. He states that he no longer wishes to end his life, stating "wanting to die was an act of desperation, wanting a way out, but there is still value in life. As miserable as it is living with MS, I still want to live." He cites his wife of 49 years as a major protective factor, saying that he could not live without her and he fears she could not live without him. He describes hopes to return home and eat the food he still enjoys despite losing his sense of taste, feels "I still have a lot to contribute to the world." States that he wishes to go home where he is comfortable and that staying in the ED is "like a assisted."  Poor sleep last night, fair appetite this morning. Patient adamantly and repeatedly denies SI.     Spoke to patient's wife today. Longstanding dysthymia in the setting of medical problems, exacerbated by contextual factors (diarrhea, poor sleep). Often his low mood will resolve in several hours. At baseline when he feels low he makes statements that life is not worth living, that he wishes to die. Mood has declined since covid w/ increased isolation, also distressed by political climate and loss of hearing in [place] 6 months ago. She describes chronic dysthymia worsened over the past 6 months.  Last admission at [facility], patient had a plan and was in an online group called ?"final exit" of individuals planning suicide.  At this time wife is "not a concerned as I was a few years ago when he had a real plan." Not sure if she thinks he needs hospitalization, would accept him home.    Pt's outpatient therapist Abdiaziz Cunningham: [phone]: reporting increase in psychiatric symptoms, low mood, amotivation, no meaning in life, using generalized statements that "I don't have a lot to live for" but qualifies to say that he would not hurt himself. Yesterday on seeing Dr. Méndez "patient says he would kill himself with a gun or slash his throat with a knife if he had the courage, and if he lived in a state with assisted suicide he would use that."    Per collateral from outpatient Psychiatrist Dr. Méndez ([phone]):   Chronically dysthymic, worsened due to isolation during the pandemic, and recently even more depressed. Has repeatedly developed hyponatremia on each antidepressant tried, weaned off buproprion last year, and no further plans for antidepressant meds given consistent pattern of hyponatremia.  Saying he wants to die right now in the setting of bowel incontinence. States that if he had a gun he would shoot himself. Has a history of looking up how to commit suicide (at time of last hospitalization). Provider felt that patient was not safe to return home, concerned for imminent risk.  Prior meds: Wellbutrin (used for the longest of all), Celexa, Cymbalta, nortriptyline, remeron; all had to be discontinued due to hyponatremia.

## 2022-07-13 NOTE — BH CONSULTATION LIAISON PROGRESS NOTE - MSE UNSTRUCTURED FT
The patient appears stated age, fair hygiene and dressed appropriately in hospital attire.  The patient was calm, cooperative with the interview and maintained appropriate eye contact.  No psychomotor agitation or retardation noted.  The patient's speech was fluent, normal in tone, rate, and volume.  The patient's mood is "tired."  Affect is depressed, anxious, constricted, stable and congruent.  The patient's thoughts are goal directed.  Denies any delusions or hallucinations. Denies any suicidal or homicidal ideation, intent, or plan at this time, although acknowledges active SI as recently as yesterday.  Insight is fair.  Judgment is impaired.  Impulse control has been fair thus far.

## 2022-07-13 NOTE — ED BEHAVIORAL HEALTH NOTE - BEHAVIORAL HEALTH NOTE
Writer called patient's wife and reviewed discharge plan, including follow up appointments, recommendation for support and monitoring, and return protocol and procedure. Questions answered to patient and family's satisfaction.

## 2022-07-13 NOTE — BH CONSULTATION LIAISON PROGRESS NOTE - NSBHCHARTREVIEWLAB_PSY_A_CORE FT
11.8   9.56  )-----------( 257      ( 12 Jul 2022 13:57 )             34.4     07-12    128<L>  |  95<L>  |  12  ----------------------------<  83  3.7   |  23  |  0.68    Ca    8.8      12 Jul 2022 20:40    TPro  6.8  /  Alb  4.1  /  TBili  0.4  /  DBili  x   /  AST  13  /  ALT  7   /  AlkPhos  60  07-12    THC, Urine Qualitative: Positive (07.12.22 @ 13:57)   Oxycodone, Urine: Negative(07.12.22 @ 13:57)   Methadone, Urine: Negative (07.12.22 @ 13:57)   Phencyclidine Level, Urine: Negative (07.12.22 @ 13:57)   Barbiturates Screen, Urine: Negative (07.12.22 @ 13:57)   Opiate, Urine: Negative (07.12.22 @ 13:57)   Amphetamine, Urine: Negative (07.12.22 @ 13:57)   Cocaine Metabolite, Urine: Negative (07.12.22 @ 13:57)   Benzodiazepine, Urine: Negative (07.12.22 @ 13:57)   Alcohol, Blood: <10: <10 mg/dL = Negative mg/dL (07.12.22 @ 13:57)     Thyroid Stimulating Hormone, Serum: 2.90 uIU/mL (07.12.22 @ 13:57)     COVID-19 PCR: NotDetec(07.12.22 @ 13:55)     Urinalysis (07.12.22 @ 13:30)   Blood, Urine: Negative   Glucose Qualitative, Urine: Trace   pH Urine: 6.0   Color: Yellow   Urine Appearance: Clear   Bilirubin: Negative   Ketone - Urine: Negative   Specific Gravity: 1.020   Protein, Urine: Trace   Urobilinogen: <2 mg/dL   Nitrite: Negative   Leukocyte Esterase Concentration: Negative

## 2022-07-13 NOTE — BH CONSULTATION LIAISON PROGRESS NOTE - NSBHCONSBHPROVDETAILS_PSY_A_CORE  FT
contacted by overnight team Writer discussed case and plan for inpatient admission with patient's outpatient psychiatrist Dr. Méndez & outpatient therapist Abdiaziz Cunningham both at [facility] ([phone]).

## 2022-07-13 NOTE — ED ADULT NURSE REASSESSMENT NOTE - NS ED NURSE REASSESS COMMENT FT1
Pt laying in stretcher, calm and cooperative. Pt assisted to bathroom and returned to stretcher. NAD, even unlabored respirations observed. Pending psychiatric admission when bed is available. Will continue to monitor for safety.

## 2022-07-13 NOTE — BH CONSULTATION LIAISON PROGRESS NOTE - NSBHCONSULTFOLLOW_PSY_ALL_CORE
Not applicable, patient requires inpatient psychiatric admission... No, psychiatric follow up needed - call with questions...

## 2022-07-13 NOTE — BH CONSULTATION LIAISON PROGRESS NOTE - NSBHCONSULTFOLLOWAFTERCARE_PSY_A_CORE FT
Patient is already engaged in care with psychiatrist and therapist.   For acute needs, patient may follow up at [facility], [phone], [address] [place] [address].  [facility] available at [phone] if patient desires to change providers. Patient is already engaged in care with psychiatrist and therapist. Confirmed appointment with therapist Abdiaziz Cunningham tomorrow 7/14 at 08:30am, and appointment Friday 7/15  to evaluate for their partial program.  For acute needs, patient may follow up at [facility], [phone], [address] [place] [address].  [facility] available at [phone] if patient desires to change providers.

## 2022-07-13 NOTE — BH CONSULTATION LIAISON PROGRESS NOTE - NSBHCHARTREVIEWVS_PSY_A_CORE FT
Vital Signs Last 24 Hrs  T(C): 36.4 (13 Jul 2022 03:43), Max: 36.9 (12 Jul 2022 21:39)  T(F): 97.6 (13 Jul 2022 03:43), Max: 98.5 (12 Jul 2022 21:39)  HR: 52 (13 Jul 2022 03:43) (52 - 68)  BP: 122/59 (13 Jul 2022 03:43) (122/59 - 156/71)  BP(mean): --  RR: 16 (13 Jul 2022 03:43) (16 - 18)  SpO2: 100% (13 Jul 2022 03:43) (98% - 100%)    Parameters below as of 13 Jul 2022 03:43  Patient On (Oxygen Delivery Method): room air

## 2022-08-04 NOTE — BH CONSULTATION LIAISON PROGRESS NOTE - CURRENT MEDICATION
MEDICATIONS  (STANDING):  gabapentin 400 milliGRAM(s) Oral three times a day  levETIRAcetam 750 milliGRAM(s) Oral two times a day  lisinopril 10 milliGRAM(s) Oral daily  pantoprazole    Tablet 40 milliGRAM(s) Oral before breakfast  risedronate 35 milliGRAM(s) Oral <User Schedule>  tamsulosin 0.4 milliGRAM(s) Oral at bedtime    MEDICATIONS  (PRN):  baclofen 10 milliGRAM(s) Oral every 8 hours PRN Muscle Spasm  polyethylene glycol 3350 17 Gram(s) Oral daily PRN Constipation  tiZANidine 2 milliGRAM(s) Oral every 6 hours PRN Muscle Spasm  
high school

## 2023-07-16 NOTE — ED BEHAVIORAL HEALTH ASSESSMENT NOTE - NSBHATTESTAPPAMEND_PSY_A_CORE
Statement Selected
I have personally seen and examined this patient. I fully participated in the care of this patient. I have made amendments to the documentation where appropriate and otherwise agree with the history, physical exam, and plan as documented by the FELIPE

## 2025-04-22 NOTE — ED BEHAVIORAL HEALTH ASSESSMENT NOTE - SUMMARY
clears Patient is a 72 year old  retired male currently residing with wife and HHA 2x per week. [facility] MDD. Hx of 1 past inpatient admission at [facility] for suicidal ideation. No past suicide attempts. No history of aggression/violence. + history of substance use- alcohol use disorder in  since 2005- past history of rehab, AA & history of seizure (not withdrawal r/t). No other substance use. Past history of DWI in 1996. PMH- MS, GERD, BPH, HTN, Arthritis Hx of meniscus tear/ACL tear bilateral knee & Hx of falls (uses walker). BIB wife for suicidal ideation.    Patient presents to the ED for suicidal ideation. Patient endorses worsening suicidal ideation x a few months- wanting to die/kill self related to poor quality of life due to MS and impaired ability to function. He is hopeless and is requesting inpatient psychiatric admission. Patient presents an imminent risk to self requires inpatient admission for safety and stabilization. Patient is a 72 year old  retired male currently residing with wife and HHA 2x per week. PPH MDD. Hx of 1 past inpatient admission at [facility] for suicidal ideation. No past suicide attempts. No history of aggression/violence. + history of substance use- alcohol use disorder in  since 2005- past history of rehab, AA & history of seizure (not withdrawal r/t). No other substance use. Past history of DWI in 1996. PMH- MS, GERD, BPH, HTN, Arthritis Hx of meniscus tear/ACL tear bilateral knee & Hx of falls (uses walker). BIB wife for suicidal ideation.    Patient presents to the ED for suicidal ideation. Patient endorses worsening suicidal ideation x a few months- wanting to die/kill self related to poor quality of life due to MS and impaired ability to function. He is hopeless, unable to safety plan. Patient presents an imminent risk to self requires inpatient admission for safety and stabilization.